# Patient Record
(demographics unavailable — no encounter records)

---

## 2025-05-02 NOTE — HISTORY OF PRESENT ILLNESS
[FreeTextEntry1] : fu- face spots [de-identified] : 73 y/o F here for spot check #spots x yrs, some very itchy bothersome on back, interested in cosmetic removal

## 2025-05-02 NOTE — ASSESSMENT
[FreeTextEntry1] : #Seborrheic Keratosis x12  - Counseled about clinically benign lesions - Discussed cosmetic treatments may be considered with cryotherapy. Discussed that treatment is associated with an out of pocket cost of $200 for up to 15 lesions, opts for tx today  -- Session #1   Procedure note: Cryotherapy  Verbal consent obtained. Risks/benefits/alternatives discussed including but not limited to risk of pain, inflammatory and blistering reaction, infection, risk of permanent scar and/or dyspigmentation, recurrence, possible lack of efficacy and need for repeat treatments. Site confirmed. *** lesion(s) treated with cryotherapy: liquid nitrogen x 2 rapid freeze-slow thaw cycles. Discussed wound care instructions. Patient tolerated well with no immediate complications.   RTC prn

## 2025-05-02 NOTE — PHYSICAL EXAM
[FreeTextEntry3] : Focused skin exam per pt preference x9 well demarcated stuck on appearing light brown plaques on the face and 3 on back